# Patient Record
Sex: MALE | Race: WHITE | NOT HISPANIC OR LATINO | Employment: FULL TIME | URBAN - METROPOLITAN AREA
[De-identification: names, ages, dates, MRNs, and addresses within clinical notes are randomized per-mention and may not be internally consistent; named-entity substitution may affect disease eponyms.]

---
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
## 2021-03-10 ENCOUNTER — OFFICE VISIT (OUTPATIENT)
Dept: FAMILY MEDICINE | Facility: CLINIC | Age: 21
End: 2021-03-10
Payer: COMMERCIAL

## 2021-03-10 VITALS
BODY MASS INDEX: 22.18 KG/M2 | HEART RATE: 86 BPM | HEIGHT: 75 IN | OXYGEN SATURATION: 97 % | WEIGHT: 178.38 LBS | SYSTOLIC BLOOD PRESSURE: 110 MMHG | TEMPERATURE: 98 F | DIASTOLIC BLOOD PRESSURE: 70 MMHG

## 2021-03-10 DIAGNOSIS — R07.9 CHEST PAIN, UNSPECIFIED TYPE: Primary | ICD-10-CM

## 2021-03-10 DIAGNOSIS — S29.011D MUSCLE STRAIN OF CHEST WALL, SUBSEQUENT ENCOUNTER: ICD-10-CM

## 2021-03-10 PROCEDURE — 93005 ELECTROCARDIOGRAM TRACING: CPT | Mod: S$GLB,,, | Performed by: NURSE PRACTITIONER

## 2021-03-10 PROCEDURE — 3008F PR BODY MASS INDEX (BMI) DOCUMENTED: ICD-10-PCS | Mod: CPTII,S$GLB,, | Performed by: NURSE PRACTITIONER

## 2021-03-10 PROCEDURE — 99213 PR OFFICE/OUTPT VISIT, EST, LEVL III, 20-29 MIN: ICD-10-PCS | Mod: S$GLB,,, | Performed by: NURSE PRACTITIONER

## 2021-03-10 PROCEDURE — 93005 EKG 12-LEAD: ICD-10-PCS | Mod: S$GLB,,, | Performed by: NURSE PRACTITIONER

## 2021-03-10 PROCEDURE — 99213 OFFICE O/P EST LOW 20 MIN: CPT | Mod: S$GLB,,, | Performed by: NURSE PRACTITIONER

## 2021-03-10 PROCEDURE — 93010 EKG 12-LEAD: ICD-10-PCS | Mod: S$GLB,,, | Performed by: INTERNAL MEDICINE

## 2021-03-10 PROCEDURE — 3008F BODY MASS INDEX DOCD: CPT | Mod: CPTII,S$GLB,, | Performed by: NURSE PRACTITIONER

## 2021-03-10 PROCEDURE — 99999 PR PBB SHADOW E&M-EST. PATIENT-LVL III: CPT | Mod: PBBFAC,,, | Performed by: NURSE PRACTITIONER

## 2021-03-10 PROCEDURE — 99999 PR PBB SHADOW E&M-EST. PATIENT-LVL III: ICD-10-PCS | Mod: PBBFAC,,, | Performed by: NURSE PRACTITIONER

## 2021-03-10 PROCEDURE — 93010 ELECTROCARDIOGRAM REPORT: CPT | Mod: S$GLB,,, | Performed by: INTERNAL MEDICINE

## 2021-03-10 RX ORDER — NAPROXEN SODIUM 550 MG/1
550 TABLET ORAL
Qty: 21 TABLET | Refills: 0 | Status: SHIPPED | OUTPATIENT
Start: 2021-03-10 | End: 2021-03-17

## 2021-03-10 RX ORDER — TIZANIDINE 4 MG/1
4 TABLET ORAL EVERY 6 HOURS PRN
Qty: 30 TABLET | Refills: 1 | Status: SHIPPED | OUTPATIENT
Start: 2021-03-10 | End: 2021-03-20

## 2022-01-06 ENCOUNTER — OCCUPATIONAL HEALTH (OUTPATIENT)
Dept: URGENT CARE | Facility: CLINIC | Age: 22
End: 2022-01-06

## 2022-01-06 DIAGNOSIS — Z02.1 PRE-EMPLOYMENT EXAMINATION: Primary | ICD-10-CM

## 2022-01-06 PROCEDURE — 97750 LIFT TEST: ICD-10-PCS | Mod: S$GLB,,, | Performed by: NURSE PRACTITIONER

## 2022-01-06 PROCEDURE — 94010 BREATHING CAPACITY TEST: CPT | Mod: S$GLB,,, | Performed by: NURSE PRACTITIONER

## 2022-01-06 PROCEDURE — 99002 MASK FIT-QUANTITATIVE: ICD-10-PCS | Mod: S$GLB,,, | Performed by: NURSE PRACTITIONER

## 2022-01-06 PROCEDURE — 72110 XR LUMBAR SPINE COMPLETE 5 VIEW: ICD-10-PCS | Mod: FY,S$GLB,, | Performed by: RADIOLOGY

## 2022-01-06 PROCEDURE — 97750 PHYSICAL PERFORMANCE TEST: CPT | Mod: S$GLB,,, | Performed by: NURSE PRACTITIONER

## 2022-01-06 PROCEDURE — 99499 PHYSICAL, BASIC COMPLEXITY: ICD-10-PCS | Mod: S$GLB,,, | Performed by: NURSE PRACTITIONER

## 2022-01-06 PROCEDURE — 99080 SPECIAL REPORTS OR FORMS: CPT | Mod: S$GLB,,, | Performed by: NURSE PRACTITIONER

## 2022-01-06 PROCEDURE — 99080 OSHA QUESTIONNAIRE: ICD-10-PCS | Mod: S$GLB,,, | Performed by: NURSE PRACTITIONER

## 2022-01-06 PROCEDURE — 71045 X-RAY EXAM CHEST 1 VIEW: CPT | Mod: FY,S$GLB,, | Performed by: RADIOLOGY

## 2022-01-06 PROCEDURE — 99499 UNLISTED E&M SERVICE: CPT | Mod: S$GLB,,, | Performed by: NURSE PRACTITIONER

## 2022-01-06 PROCEDURE — 72110 X-RAY EXAM L-2 SPINE 4/>VWS: CPT | Mod: FY,S$GLB,, | Performed by: RADIOLOGY

## 2022-01-06 PROCEDURE — 89240 PANEL 2 (CMP. CBC W/DIFF, UA, MICR): ICD-10-PCS | Mod: S$GLB,,, | Performed by: NURSE PRACTITIONER

## 2022-01-06 PROCEDURE — 99002 DEVICE HANDLING PHYS/QHP: CPT | Mod: S$GLB,,, | Performed by: NURSE PRACTITIONER

## 2022-01-06 PROCEDURE — 71045 XR CHEST 1 VIEW: ICD-10-PCS | Mod: FY,S$GLB,, | Performed by: RADIOLOGY

## 2022-01-06 PROCEDURE — 89240 UNLISTED MISC PATH TEST: CPT | Mod: S$GLB,,, | Performed by: NURSE PRACTITIONER

## 2022-01-06 PROCEDURE — 94010 PULMONARY FUNCTION SCREENING (OCC MED PHYSICALS): ICD-10-PCS | Mod: S$GLB,,, | Performed by: NURSE PRACTITIONER

## 2022-01-07 ENCOUNTER — TELEPHONE (OUTPATIENT)
Dept: URGENT CARE | Facility: CLINIC | Age: 22
End: 2022-01-07
Payer: COMMERCIAL

## 2022-01-07 LAB
ALBUMIN SERPL-MCNC: 5.3 G/DL (ref 4.1–5.2)
ALBUMIN/GLOB SERPL: 1.8 {RATIO} (ref 1.2–2.2)
ALP SERPL-CCNC: 84 IU/L (ref 51–125)
ALT SERPL-CCNC: 14 IU/L (ref 0–44)
APPEARANCE UR: ABNORMAL
AST SERPL-CCNC: 19 IU/L (ref 0–40)
BASOPHILS # BLD AUTO: 0 X10E3/UL (ref 0–0.2)
BASOPHILS NFR BLD AUTO: 1 %
BILIRUB SERPL-MCNC: 0.6 MG/DL (ref 0–1.2)
BILIRUB UR QL STRIP: NEGATIVE
BUN SERPL-MCNC: 17 MG/DL (ref 6–20)
BUN/CREAT SERPL: 14 (ref 9–20)
CALCIUM SERPL-MCNC: 10.4 MG/DL (ref 8.7–10.2)
CHLORIDE SERPL-SCNC: 101 MMOL/L (ref 96–106)
CHOLEST SERPL-MCNC: 149 MG/DL (ref 100–199)
CO2 SERPL-SCNC: 26 MMOL/L (ref 20–29)
COLOR UR: YELLOW
CREAT SERPL-MCNC: 1.24 MG/DL (ref 0.76–1.27)
EOSINOPHIL # BLD AUTO: 0.1 X10E3/UL (ref 0–0.4)
EOSINOPHIL NFR BLD AUTO: 1 %
ERYTHROCYTE [DISTWIDTH] IN BLOOD BY AUTOMATED COUNT: 13.1 % (ref 11.6–15.4)
GGT SERPL-CCNC: 12 IU/L (ref 0–65)
GLOBULIN SER CALC-MCNC: 3 G/DL (ref 1.5–4.5)
GLUCOSE SERPL-MCNC: 95 MG/DL (ref 65–99)
GLUCOSE UR QL: NEGATIVE
HCT VFR BLD AUTO: 49.3 % (ref 37.5–51)
HDLC SERPL-MCNC: 53 MG/DL
HGB BLD-MCNC: 16.5 G/DL (ref 13–17.7)
HGB UR QL STRIP: NEGATIVE
IMM GRANULOCYTES # BLD AUTO: 0 X10E3/UL (ref 0–0.1)
IMM GRANULOCYTES NFR BLD AUTO: 0 %
KETONES UR QL STRIP: NEGATIVE
LDLC SERPL CALC-MCNC: 81 MG/DL (ref 0–99)
LEUKOCYTE ESTERASE UR QL STRIP: NEGATIVE
LYMPHOCYTES # BLD AUTO: 2.1 X10E3/UL (ref 0.7–3.1)
LYMPHOCYTES NFR BLD AUTO: 33 %
MCH RBC QN AUTO: 30.8 PG (ref 26.6–33)
MCHC RBC AUTO-ENTMCNC: 33.5 G/DL (ref 31.5–35.7)
MCV RBC AUTO: 92 FL (ref 79–97)
MICRO URNS: ABNORMAL
MONOCYTES # BLD AUTO: 0.6 X10E3/UL (ref 0.1–0.9)
MONOCYTES NFR BLD AUTO: 9 %
NEUTROPHILS # BLD AUTO: 3.5 X10E3/UL (ref 1.4–7)
NEUTROPHILS NFR BLD AUTO: 56 %
NITRITE UR QL STRIP: NEGATIVE
PH UR STRIP: 8.5 [PH] (ref 5–7.5)
PLATELET # BLD AUTO: 231 X10E3/UL (ref 150–450)
POTASSIUM SERPL-SCNC: 5.1 MMOL/L (ref 3.5–5.2)
PROT SERPL-MCNC: 8.3 G/DL (ref 6–8.5)
PROT UR QL STRIP: NEGATIVE
RBC # BLD AUTO: 5.36 X10E6/UL (ref 4.14–5.8)
SODIUM SERPL-SCNC: 140 MMOL/L (ref 134–144)
SP GR UR: 1.02 (ref 1–1.03)
TRIGL SERPL-MCNC: 79 MG/DL (ref 0–149)
UROBILINOGEN UR STRIP-MCNC: 0.2 MG/DL (ref 0.2–1)
VLDLC SERPL CALC-MCNC: 15 MG/DL (ref 5–40)
WBC # BLD AUTO: 6.3 X10E3/UL (ref 3.4–10.8)

## 2022-05-31 ENCOUNTER — PATIENT MESSAGE (OUTPATIENT)
Dept: ADMINISTRATIVE | Facility: HOSPITAL | Age: 22
End: 2022-05-31
Payer: COMMERCIAL

## 2022-07-27 ENCOUNTER — OFFICE VISIT (OUTPATIENT)
Dept: FAMILY MEDICINE | Facility: CLINIC | Age: 22
End: 2022-07-27
Payer: COMMERCIAL

## 2022-07-27 ENCOUNTER — TELEPHONE (OUTPATIENT)
Dept: FAMILY MEDICINE | Facility: CLINIC | Age: 22
End: 2022-07-27

## 2022-07-27 VITALS
WEIGHT: 210.56 LBS | HEART RATE: 63 BPM | SYSTOLIC BLOOD PRESSURE: 100 MMHG | TEMPERATURE: 99 F | BODY MASS INDEX: 26.18 KG/M2 | DIASTOLIC BLOOD PRESSURE: 62 MMHG | HEIGHT: 75 IN | OXYGEN SATURATION: 97 %

## 2022-07-27 DIAGNOSIS — B37.2 YEAST DERMATITIS: Primary | ICD-10-CM

## 2022-07-27 DIAGNOSIS — K40.90 LEFT INGUINAL HERNIA: ICD-10-CM

## 2022-07-27 PROCEDURE — 99999 PR PBB SHADOW E&M-EST. PATIENT-LVL III: ICD-10-PCS | Mod: PBBFAC,,, | Performed by: PHYSICIAN ASSISTANT

## 2022-07-27 PROCEDURE — 3078F DIAST BP <80 MM HG: CPT | Mod: CPTII,S$GLB,, | Performed by: PHYSICIAN ASSISTANT

## 2022-07-27 PROCEDURE — 99999 PR PBB SHADOW E&M-EST. PATIENT-LVL III: CPT | Mod: PBBFAC,,, | Performed by: PHYSICIAN ASSISTANT

## 2022-07-27 PROCEDURE — 99213 OFFICE O/P EST LOW 20 MIN: CPT | Mod: S$GLB,,, | Performed by: PHYSICIAN ASSISTANT

## 2022-07-27 PROCEDURE — 3078F PR MOST RECENT DIASTOLIC BLOOD PRESSURE < 80 MM HG: ICD-10-PCS | Mod: CPTII,S$GLB,, | Performed by: PHYSICIAN ASSISTANT

## 2022-07-27 PROCEDURE — 1159F PR MEDICATION LIST DOCUMENTED IN MEDICAL RECORD: ICD-10-PCS | Mod: CPTII,S$GLB,, | Performed by: PHYSICIAN ASSISTANT

## 2022-07-27 PROCEDURE — 3008F BODY MASS INDEX DOCD: CPT | Mod: CPTII,S$GLB,, | Performed by: PHYSICIAN ASSISTANT

## 2022-07-27 PROCEDURE — 1159F MED LIST DOCD IN RCRD: CPT | Mod: CPTII,S$GLB,, | Performed by: PHYSICIAN ASSISTANT

## 2022-07-27 PROCEDURE — 3074F SYST BP LT 130 MM HG: CPT | Mod: CPTII,S$GLB,, | Performed by: PHYSICIAN ASSISTANT

## 2022-07-27 PROCEDURE — 3008F PR BODY MASS INDEX (BMI) DOCUMENTED: ICD-10-PCS | Mod: CPTII,S$GLB,, | Performed by: PHYSICIAN ASSISTANT

## 2022-07-27 PROCEDURE — 99213 PR OFFICE/OUTPT VISIT, EST, LEVL III, 20-29 MIN: ICD-10-PCS | Mod: S$GLB,,, | Performed by: PHYSICIAN ASSISTANT

## 2022-07-27 PROCEDURE — 3074F PR MOST RECENT SYSTOLIC BLOOD PRESSURE < 130 MM HG: ICD-10-PCS | Mod: CPTII,S$GLB,, | Performed by: PHYSICIAN ASSISTANT

## 2022-07-27 RX ORDER — FLUCONAZOLE 150 MG/1
150 TABLET ORAL DAILY
Qty: 1 TABLET | Refills: 0 | Status: SHIPPED | OUTPATIENT
Start: 2022-07-27 | End: 2022-07-28

## 2022-07-27 RX ORDER — KETOCONAZOLE 20 MG/G
CREAM TOPICAL DAILY
Qty: 60 G | Refills: 3 | Status: SHIPPED | OUTPATIENT
Start: 2022-07-27

## 2022-07-27 NOTE — TELEPHONE ENCOUNTER
----- Message from Marcelina Pierce sent at 7/27/2022  8:13 AM CDT -----  Contact: Mom, Molly  Type: Needs Medical Advice    Who Called:  Mom    Additional Information: Pt running late, went to wrong location.  Should be there in 5 min.

## 2022-07-27 NOTE — PROGRESS NOTES
Subjective:       Patient ID: Levi Huggins is a 22 y.o. male.    Chief Complaint: Rash (Private area) and Hernia    Presents with 2 complaints.  He states he has noticed over the last several weeks that he feels that he has developed a hernia in the left inguinal area.  He states he feels like when he strained something pops out but nothing is becoming stuck.  He denies any pain in the area but states he can feel that something isn't right.  He denies any known injury but does do a lot a heavy lifting with his job as well as used to play football.  Patient also complains of a red rash in the groin area that he believes to be heat rash.  He states it does itch when he becomes overheated but is otherwise not bothersome.  He denies using or trying any over-the-counter medications.    Review of Systems   Constitutional: Negative for activity change, appetite change, fatigue and unexpected weight change.   HENT: Negative for nosebleeds.    Eyes: Negative for pain and visual disturbance.   Respiratory: Negative for chest tightness, shortness of breath and wheezing.    Cardiovascular: Negative for chest pain, palpitations and leg swelling.   Gastrointestinal: Negative.    Genitourinary: Negative.    Musculoskeletal: Negative for neck pain.   Integumentary:  Positive for color change.   Neurological: Negative for dizziness, syncope, light-headedness and headaches.         Objective:      Physical Exam  Constitutional:       General: He is not in acute distress.     Appearance: He is well-developed. He is not diaphoretic.   HENT:      Head: Normocephalic and atraumatic.   Eyes:      Conjunctiva/sclera: Conjunctivae normal.      Pupils: Pupils are equal, round, and reactive to light.   Neck:      Thyroid: No thyromegaly.      Vascular: No JVD.   Cardiovascular:      Rate and Rhythm: Normal rate and regular rhythm.      Heart sounds: No murmur heard.    No friction rub. No gallop.   Pulmonary:      Effort: Pulmonary effort  is normal. No respiratory distress.      Breath sounds: No wheezing or rales.   Chest:      Chest wall: No tenderness.   Abdominal:      Hernia: A hernia is present. Hernia is present in the left inguinal area (Very small left inguinal hernia.  There is no significant protrusion.  No pain associated with the area.).   Musculoskeletal:      Cervical back: Normal range of motion and neck supple.   Skin:     Findings: Rash ( there is an erythematous, flat, diffuse rash in the groin consistent with yeast dermatitis.  There is no break in the skin or secondary cellulitis.) present.   Neurological:      Mental Status: He is alert and oriented to person, place, and time.         Assessment:       Problem List Items Addressed This Visit    None     Visit Diagnoses     Yeast dermatitis    -  Primary    Relevant Medications    fluconazole (DIFLUCAN) 150 MG Tab    ketoconazole (NIZORAL) 2 % cream    Left inguinal hernia        continue to monitor for worsening          Plan:       Levi was seen today for rash and hernia.    Diagnoses and all orders for this visit:    Yeast dermatitis  -     fluconazole (DIFLUCAN) 150 MG Tab; Take 1 tablet (150 mg total) by mouth once daily. for 1 day  -     ketoconazole (NIZORAL) 2 % cream; Apply topically once daily.    Left inguinal hernia  Comments:  continue to monitor for worsening.  Encourage patient to use a lifting belt to decrease strain on the area.  He is to monitor for any worsening in any strangulation or worsening protrusions.  If anything become strangulated he understands to go to the emergency room immediately.  If it does continue to worsen we can send him for surgical consultation.

## 2022-12-20 ENCOUNTER — OCCUPATIONAL HEALTH (OUTPATIENT)
Dept: URGENT CARE | Facility: CLINIC | Age: 22
End: 2022-12-20

## 2022-12-20 DIAGNOSIS — Z00.00 ENCOUNTER FOR PHYSICAL EXAMINATION: Primary | ICD-10-CM

## 2022-12-20 DIAGNOSIS — Z02.83 ENCOUNTER FOR DRUG SCREENING: Primary | ICD-10-CM

## 2022-12-20 LAB — BREATH ALCOHOL: 0

## 2022-12-20 PROCEDURE — 82075 POCT BREATH ALCOHOL TEST: ICD-10-PCS | Mod: S$GLB,,, | Performed by: EMERGENCY MEDICINE

## 2022-12-20 PROCEDURE — 99000 SPECIMEN HANDLING: ICD-10-PCS | Mod: S$GLB,,, | Performed by: STUDENT IN AN ORGANIZED HEALTH CARE EDUCATION/TRAINING PROGRAM

## 2022-12-20 PROCEDURE — 99002 DEVICE HANDLING PHYS/QHP: CPT | Mod: S$GLB,,, | Performed by: STUDENT IN AN ORGANIZED HEALTH CARE EDUCATION/TRAINING PROGRAM

## 2022-12-20 PROCEDURE — 82075 ASSAY OF BREATH ETHANOL: CPT | Mod: S$GLB,,, | Performed by: EMERGENCY MEDICINE

## 2022-12-20 PROCEDURE — 99080 OSHA QUESTIONNAIRE: ICD-10-PCS | Mod: S$GLB,,, | Performed by: STUDENT IN AN ORGANIZED HEALTH CARE EDUCATION/TRAINING PROGRAM

## 2022-12-20 PROCEDURE — 94010 BREATHING CAPACITY TEST: CPT | Mod: S$GLB,,, | Performed by: STUDENT IN AN ORGANIZED HEALTH CARE EDUCATION/TRAINING PROGRAM

## 2022-12-20 PROCEDURE — 99000 SPECIMEN HANDLING OFFICE-LAB: CPT | Mod: S$GLB,,, | Performed by: STUDENT IN AN ORGANIZED HEALTH CARE EDUCATION/TRAINING PROGRAM

## 2022-12-20 PROCEDURE — 80305 DRUG TEST PRSMV DIR OPT OBS: CPT | Mod: S$GLB,,, | Performed by: EMERGENCY MEDICINE

## 2022-12-20 PROCEDURE — 36415 COLL VENOUS BLD VENIPUNCTURE: CPT | Mod: S$GLB,,, | Performed by: STUDENT IN AN ORGANIZED HEALTH CARE EDUCATION/TRAINING PROGRAM

## 2022-12-20 PROCEDURE — 99002 MASK FIT-QUANTITATIVE: ICD-10-PCS | Mod: S$GLB,,, | Performed by: STUDENT IN AN ORGANIZED HEALTH CARE EDUCATION/TRAINING PROGRAM

## 2022-12-20 PROCEDURE — 36415 VENIPUNCTURE: ICD-10-PCS | Mod: S$GLB,,, | Performed by: STUDENT IN AN ORGANIZED HEALTH CARE EDUCATION/TRAINING PROGRAM

## 2022-12-20 PROCEDURE — 89240 UNLISTED MISC PATH TEST: CPT | Mod: S$GLB,,, | Performed by: STUDENT IN AN ORGANIZED HEALTH CARE EDUCATION/TRAINING PROGRAM

## 2022-12-20 PROCEDURE — 99080 SPECIAL REPORTS OR FORMS: CPT | Mod: S$GLB,,, | Performed by: STUDENT IN AN ORGANIZED HEALTH CARE EDUCATION/TRAINING PROGRAM

## 2022-12-20 PROCEDURE — 80305 OOH COLLECTION ONLY DRUG SCREEN: ICD-10-PCS | Mod: S$GLB,,, | Performed by: EMERGENCY MEDICINE

## 2022-12-20 PROCEDURE — 89240 PANEL 2 (CMP. CBC W/DIFF, UA, MICR): ICD-10-PCS | Mod: S$GLB,,, | Performed by: STUDENT IN AN ORGANIZED HEALTH CARE EDUCATION/TRAINING PROGRAM

## 2022-12-20 PROCEDURE — 94010 PULMONARY FUNCTION SCREENING (OCC MED PHYSICALS): ICD-10-PCS | Mod: S$GLB,,, | Performed by: STUDENT IN AN ORGANIZED HEALTH CARE EDUCATION/TRAINING PROGRAM

## 2024-05-23 NOTE — LETTER
[facility]  [address]  [facility] [address]  Phone: [phone]  Fax: [phone]       08/31/2018      To whom it may concern:      Levi Huggins is clear to return full activities without restriction.       Sincerely,    MD Mary Ritchie PA-C  
   [facility]  [address]  [facility] [address]  Phone: [phone]  Fax: [phone]       08/31/2018       Please excuse Levi Huggins from school 08/31/2018. He is clear to return to school 9/3/18.       Sincerely,     Mary Bowesr PA-C  
15

## 2024-05-30 NOTE — H&P
REASON FOR VISIT:  Bronchoscopy    PROBLEM LIST:  Dyspnea  Right aortic arch    MEDICINES:  None    HISTORY OF PRESENT ILLNESS:   Chronic dyspnea.  Work-up revealed right aortic arch.  Concern of possible airway obstruction.  Scheduled for airwy    REVIEW OF SYSTEMS:     Review of Systems   Constitutional: Negative for activity change, appetite change and fever.   HENT: Negative for rhinorrhea.    Eyes: Negative for itching.   Respiratory: Positive for shortness of breath. Negative for cough, choking and wheezing.    Cardiovascular: Negative for chest pain, palpitations and leg swelling.   Gastrointestinal: Negative for diarrhea and vomiting.   Genitourinary: Negative for decreased urine volume and dysuria.   Musculoskeletal: Negative for arthralgias, gait problem and joint swelling.   Skin: Negative for rash.   Neurological: Negative for seizures.   Psychiatric/Behavioral: Negative for sleep disturbance.       PHYSICAL EXAM:      Physical Exam   Constitutional: He appears well-developed and well-nourished.   HENT:   Head: Normocephalic.   Mouth/Throat: Oropharynx is clear and moist.   Eyes: Conjunctivae and EOM are normal. Pupils are equal, round, and reactive to light.   Neck: Normal range of motion.   Cardiovascular: Normal rate and normal heart sounds.    Pulmonary/Chest: Effort normal. He has no wheezes.   Abdominal: Soft.   Musculoskeletal: Normal range of motion.   Neurological: He is alert.   Skin: Skin is warm.   Nursing note and vitals reviewed.          ASSESSMENT:     Dyspnea    PLAN:   Bronch (with EGD)   Awake/Alert/Cooperative

## 2024-12-24 ENCOUNTER — OFFICE VISIT (OUTPATIENT)
Dept: FAMILY MEDICINE | Facility: CLINIC | Age: 24
End: 2024-12-24

## 2024-12-24 VITALS
HEART RATE: 93 BPM | RESPIRATION RATE: 18 BRPM | OXYGEN SATURATION: 99 % | SYSTOLIC BLOOD PRESSURE: 102 MMHG | BODY MASS INDEX: 25.61 KG/M2 | HEIGHT: 75 IN | DIASTOLIC BLOOD PRESSURE: 72 MMHG | TEMPERATURE: 98 F | WEIGHT: 205.94 LBS

## 2024-12-24 DIAGNOSIS — Z86.59 HISTORY OF ADHD: Primary | ICD-10-CM

## 2024-12-24 PROCEDURE — 99214 OFFICE O/P EST MOD 30 MIN: CPT | Mod: PBBFAC,PO | Performed by: NURSE PRACTITIONER

## 2024-12-24 PROCEDURE — 99999 PR PBB SHADOW E&M-EST. PATIENT-LVL IV: CPT | Mod: PBBFAC,,, | Performed by: NURSE PRACTITIONER

## 2024-12-24 NOTE — PROGRESS NOTES
"Subjective:       Patient ID: Levi Huggins is a 24 y.o. male.    Chief Complaint: ADHD     HPI   25 y/o male patient with medical problems listed below presents for difficulty staying focused on tasks and forgetfulness affecting his work. Patient states was diagnosed with ADHD when he was younger and took medication which was taken off around 16 y/o or 16 y/o. States the medication helped improve focus in class but felt "emotionless" on the medication. He graduated high school. He states has feeling episodic fidgeting and anger spells. He has been working in construction for past 6 months. Patient lives with светлана, 2 daughters (3 y/o and 3 y/o) and sister. Patient smokes vape.     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 (Patient not taking: Reported on 12/24/2024), Disp: 60 g, Rfl: 3    Review of Systems   Constitutional:  Negative for chills and fever.   Respiratory:  Negative for cough and shortness of breath.    Cardiovascular:  Negative for chest pain and palpitations.   Gastrointestinal:  Negative for abdominal pain.   Neurological:  Negative for dizziness and headaches.       Objective:   /72 (BP Location: Right arm, Patient Position: Sitting)   Pulse 93   Temp 98 °F (36.7 °C) (Oral)   Resp 18   Ht 6' 3" (1.905 m)   Wt 93.4 kg (205 lb 14.6 oz)   SpO2 99%   BMI 25.74 kg/m²         Physical Exam  Vitals reviewed.   Constitutional:       General: He is not in acute distress.     Appearance: Normal appearance.   Cardiovascular:      Rate and Rhythm: Normal rate and regular rhythm.      Pulses: Normal pulses.      Heart " sounds: Normal heart sounds.   Pulmonary:      Effort: Pulmonary effort is normal.      Breath sounds: Normal breath sounds.   Chest:      Chest wall: No deformity, swelling or edema.   Abdominal:      General: Abdomen is flat. Bowel sounds are normal.      Palpations: Abdomen is soft.   Musculoskeletal:      Cervical back: Normal range of motion.   Neurological:      Mental Status: He is oriented to person, place, and time.         Assessment:       1. History of ADHD        Plan:       1. History of ADHD (Primary)  - Ambulatory referral/consult to Psychiatry; Future   [facility]  [place]  [address]  [address]  [place] [address]  P: [phone] OPT 3    [facility]  [place]  [address]  [address]  [place] [address]  P:[phone] OPT 2     ? [facility]  [place]  [address] [facility]  [place]  P: [phone] OPT 1    ? [facility] [place]  [address] [place] [address]  P:[phone]     ? [facility] CHILD  DEVELOPMENT  [address]  [place] [address]  P: [phone]    ? [facility]  [address] [place] [address]  P:[phone]  COMMUNITY RESOURCES:  [place] / [place]  ? [facility] *  [address] [facility] [place], [address]  P:[phone] [phone]  Accepts all insurances  except: [place] Healthcare  Connections    ? [place] [facility]  [address] [place] [address]  P:[phone] [phone]  IP: MEDICAID,  MEDICARE, COMM    ? [facility]  [address]  [place] [address]  P:[phone]  Psychoeducational,  autism, and ADHD evals  Accepts Medicare and  Comm ins    ? [facility]  Behavioral *  [address] [place] [address]  P:[phone]  Accepts some insurances  Sliding scale    ? [facility]  [address]  [place]  [address]  [phone]    ? [facility]  [address] [place] [address]  P:[phone]  Accepts Medicaid,  Medicare, Comm    ? [facility]  [address]  [place] [address]  P:[phone]  IP: Medicare/  Comm/Cash pay    ? [facility]  and [facility]  [address]  [place] [address]  P: [phone]  Accepts Comm, Sliding  Scale    ? [facility]  [address]  [place] [address]  P:[phone]  Accepts Comm only    ? [facility]  [address] [place] [address]  P:[phone]  Accepts Medicaid and  most Comm  Must do intake    ? [facility]  [address] [place] [address]  P:[phone]  Accepts all 5 Medicaid  and Comm    ? Ronak Ames Jr., MD  [address] [facility]  [place] [address]  P:[phone]  Takes [place] Medicaid    ? Max Resendez, PhD  [address]  [address]  [place] [address]  P:[phone]    ? Jj Resendez, PhD  [address] [address]  P:[phone]  Takes [place] Medicaid  2    [place]  ? [facility]  [address]  [place] [address]  P:[phone]  Accepts all 5 Medicaid    ? [facility] *  [address] [place] [address]  P:[phone]  F:[phone]  Accepts all insurances  except: [place] Healthcare  Connections    ? [facility] *  [address] [place] [address]  P:[phone]  F:[phone]  IOP: AmeriHealth  Medicaid, Medicare,  some Comm    ? [facility] *  [address] [address]  P:[phone]  Accepts Comm Only  Pt must call for intake  Adults: ADHD/ADD only  Children: Various  Disorders    ? [facility]  [address]  [place] [address]  [phone]  Accepts Medicaid Only    ? [facility]  Behavioral *  [address] [place] [address]  P:[phone]  Accepts some insurances  Sliding scale    ? [address]  [place] [address]  P:[phone] F: [phone] (records)  Accepts Medicaid Only    ? [facility] *  [address]  [place] [address]  P:[phone]  Cash pay only    ? [facility]  [address]  [place] [address]  P:[phone]  Couples/marriage  counseling, family, kids,  adults  Accepts some insurance    ? [facility]  [address]   [place] [address]  [phone]  F:[phone]  Cash pay / sliding scale    ? [facility]  [address] [place]  P: [phone]    ADULTS ONLY    ? [facility]  [address]  [place] [address]  P:[phone]    ? [facility]  [address] [place] [address]  [phone]  Children, Adults, Family  & Group Counseling  [place] Medicaid      [place]    ? [facility]  [place]  [address] [place] [address]  [phone]  F; [phone]  Accepts most ins, and  [place] Medicaid. Adult,  child, and family services    ? [facility],  [facility]  [address] [place] [address]  P: [phone]  Accepts some  commercial insurance  and [place] Medicaid. Adults,  children, and families    ? [facility], [facility]  Nanda Hern[facility]dez [facility]  [address] [place] [address]  P: [phone]  Accepts some ins and [place]  Medicaid    ? Jessica Chen, Francesco  [address],  [place] [address]  [phone]  Adults only  ? [facility]  [address]  [place] [address]  P: [phone]  Adults and children  medication management    ? [facility] Counsel[facility]  [address] [place] [address], [place] [address]  P: [phone]  Adults and children  Therapy only  3  [place] (CONT.)  ? [facility]  [address] [place] [address]  P: [phone]  Adults, Children,  medication  management,  individual and family  counseling sessions  ? [facility]  Dr. Adi Koenig  [address]  [place] [address]  P: [phone]  ? [facility]  [address] [place] [address]  P: [phone]  F: [phone]  Does not take ,  sees adults and children,  medication management,  therapy, and testing    ? [facility]  [address] [place] [address]  P: [phone] or  [phone]  Adults, children, med  management, therapy,  marital and family  counseling, IOP    ? [facility]  [address] [place] [address]  P: [phone] (allow  48 business hours for  reply) Adults, children,  med management,  therapy, EMDR, CBT  [facility]CENAS [facility]    ? [facility]  [address] [facility] [address]  [place] [address]  P: [phone]  ? [facility]  [address]  [place] [address]  P: [phone]  ? [facility] 3rd  floor  [address] [facility] [address]  [place] [address]  P: [phone]  ? [facility]  [address]  [place] [address]  P: [phone]  ? [facility]  - [facility]  is on the 2nd floor  [address] [address]  P: [phone]  VIRTUAL OPTIONS  ? [facility]  [facility]  [email or web address]  therapy only,  adolescents, adults,  marriage /couples  counseling, 45 minute  virtual sessions at $85  each  ? [facility]  [facility]  [email or web address]  Free for 90 days to  residents of [place], [facility], or  [facility]  P: [phone]  Email:  [email or web address]      Therapists Outside of [facility] Joiner, [facility]   [phone]    Kay Valle, [facility]   [phone].       Alpa Arnold, [facility]    [email or web address]   Office:  [address]   [place] [address]   Phone / Fax   Phone: [phone]   Email   [email or web address]       Katy Chacko, [facility]   [address] [address]   [place] [address]   [phone]    Edith Polanco, [facility]   Specializes in eating disorders, depression, anxiety-females only   [address] [place] [address]    [phone]         Soni Fontenot , PhD   [email or web address]   [phone]   [address] [place], [place] [address]      Betzaida Falk, PhD   [phone]   [address] [place] [address]      Svitlana Gutierrez [facility]   [phone]   [address] [place] [address]      Janina Carrillo [facility]   [email or web address]   [phone]   [address] [place] [address]       Patient with be reevaluated in  follow up with pcp   or sooner florencio Charles NP

## (undated) DEVICE — DRAIN CHEST WATER SEAL

## (undated) DEVICE — CLIP LIGACLIP XTRA TITANIUM

## (undated) DEVICE — SEE MEDLINE ITEM 154981

## (undated) DEVICE — DRAPE SLUSH WARMER WITH DISC

## (undated) DEVICE — SEE MEDLINE ITEM 152622

## (undated) DEVICE — SPONGE LAP 18X18 PREWASHED

## (undated) DEVICE — BLADE SAW STERNAL REG

## (undated) DEVICE — SUT LIGACLIP SMALL XTRA

## (undated) DEVICE — GAUZE SPONGE 4X4 12PLY

## (undated) DEVICE — CLOSURE SKIN STERI STRIP 1/2X4

## (undated) DEVICE — SEE MEDLINE ITEM 157117

## (undated) DEVICE — PACK OPEN HEART PEDIATRIC

## (undated) DEVICE — LUBRICANT SURGILUBE 2 OZ

## (undated) DEVICE — CATH URETHRAL 16FR RED

## (undated) DEVICE — CATH ALL PUR URTHL RR 10FR

## (undated) DEVICE — SEE MEDLINE ITEM 146292

## (undated) DEVICE — DRAIN CHANNEL ROUND 10FR

## (undated) DEVICE — CONTAINER SPECIMEN STRL 4OZ

## (undated) DEVICE — DRAIN CHANNEL ROUND 15FR

## (undated) DEVICE — NDL 18GA X1 1/2 REG BEVEL

## (undated) DEVICE — DRESSING TRANS 4X4 TEGADERM

## (undated) DEVICE — CONNECTOR Y 3/8X3/8X3/8

## (undated) DEVICE — ADAPTER SWIVEL

## (undated) DEVICE — COVER LIGHT HANDLE 80/CA

## (undated) DEVICE — PAD GROUNDING NEONATE 6-30LBS

## (undated) DEVICE — SEE MEDLINE ITEM 146313

## (undated) DEVICE — DRAPE INCISE IOBAN 2 23X17IN

## (undated) DEVICE — SEE MEDLINE ITEM 146417

## (undated) DEVICE — CLIP MED TICALL

## (undated) DEVICE — COVER LIGHT HANDLE

## (undated) DEVICE — PACK PEDIATRIC DRAPE PEELER

## (undated) DEVICE — SYR 50CC LL

## (undated) DEVICE — SPONGE GAUZE 16PLY 4X4

## (undated) DEVICE — Device

## (undated) DEVICE — DRESSING TRANS 2X2 TEGADERM

## (undated) DEVICE — NDL 22GA X1 1/2 REG BEVEL

## (undated) DEVICE — TRAY FOLEY 16FR INFECTION CONT

## (undated) DEVICE — SEE MEDLINE ITEM 157116

## (undated) DEVICE — BLADE SURGICAL 15C

## (undated) DEVICE — SPONGE DERMA 8PLY 2X2